# Patient Record
Sex: MALE | Race: WHITE | NOT HISPANIC OR LATINO | Employment: FULL TIME | URBAN - METROPOLITAN AREA
[De-identification: names, ages, dates, MRNs, and addresses within clinical notes are randomized per-mention and may not be internally consistent; named-entity substitution may affect disease eponyms.]

---

## 2019-01-23 ENCOUNTER — HOSPITAL ENCOUNTER (EMERGENCY)
Facility: CLINIC | Age: 44
Discharge: HOME | End: 2019-01-23
Attending: NURSE PRACTITIONER | Admitting: NURSE PRACTITIONER

## 2019-01-23 VITALS
BODY MASS INDEX: 22.22 KG/M2 | HEIGHT: 69 IN | RESPIRATION RATE: 16 BRPM | OXYGEN SATURATION: 100 % | SYSTOLIC BLOOD PRESSURE: 113 MMHG | TEMPERATURE: 98.1 F | WEIGHT: 150 LBS | DIASTOLIC BLOOD PRESSURE: 59 MMHG

## 2019-01-23 DIAGNOSIS — K60.2 ANAL FISSURE: Primary | ICD-10-CM

## 2019-01-23 PROCEDURE — 99284 EMERGENCY DEPT VISIT MOD MDM: CPT | Mod: Z6 | Performed by: NURSE PRACTITIONER

## 2019-01-23 PROCEDURE — 99282 EMERGENCY DEPT VISIT SF MDM: CPT | Performed by: NURSE PRACTITIONER

## 2019-01-23 RX ORDER — IBUPROFEN 200 MG
600 TABLET ORAL EVERY 6 HOURS PRN
COMMUNITY

## 2019-01-23 ASSESSMENT — ENCOUNTER SYMPTOMS
SHORTNESS OF BREATH: 0
DIAPHORESIS: 0
CONSTIPATION: 0
CHILLS: 0
HEMATURIA: 0
COUGH: 0
BLOOD IN STOOL: 0
WOUND: 0
SEIZURES: 0
APPETITE CHANGE: 1
EYE PAIN: 0
FEVER: 0
DIFFICULTY URINATING: 0
SPEECH DIFFICULTY: 0
ABDOMINAL DISTENTION: 0
NAUSEA: 1
CONFUSION: 0
DIZZINESS: 0
ABDOMINAL PAIN: 0
DIARRHEA: 1
DYSURIA: 0
RECTAL PAIN: 1
WHEEZING: 0
VOMITING: 0
ANAL BLEEDING: 0

## 2019-01-23 ASSESSMENT — MIFFLIN-ST. JEOR: SCORE: 1565.78

## 2019-01-23 NOTE — ED AVS SNAPSHOT
[facility] Emergency Department  [address] [address]  Phone:  [phone]  Fax:  [phone]                                    Doug Cowart   MRN: [id number]    Department:  [facility] Emergency Department   Date of Visit:  1/23/2019           After Visit Summary Signature Page    I have received my discharge instructions, and my questions have been answered. I have discussed any challenges I see with this plan with the nurse or doctor.    ..........................................................................................................................................  Patient/Patient Representative Signature      ..........................................................................................................................................  Patient Representative Print Name and Relationship to Patient    ..................................................               ................................................  Date                                   Time    ..........................................................................................................................................  Reviewed by Signature/Title    ...................................................              ..............................................  Date                                               Time          22EPIC Rev 08/18

## 2019-01-23 NOTE — ED PROVIDER NOTES
History     Chief Complaint   Patient presents with     Hemorrhoids     HPI  Doug Cowart is a 43 year old male who is to past medical history of tobacco abuse presenting to the emergency department with concerns of hemorrhoids.  Patient states that he has had ongoing rectal pain for the past 8 days.  Patient states that his symptoms were preceded with nausea vomiting and profuse diarrhea that is subsequently resolving.  Patient states that he has intense rectal pain that he rates as a 10 out of a 10.  Patient denies any rectal bleeding.  Patient states sitting, standing, rectal pressure aggravate his symptoms.  Patient states he has tried tub baths and this is somewhat relieving.  Patient is also been taking ibuprofen 3 tablets 4 times daily, Preparation H and Tucks without any improvement long-term.    Patient denies fever, aches, chills, sweats, chest pain, abdominal pain, dysuria, hematuria, mental status changes including speech difficulty, weakness left or right sided numbness.  Patient reports occasional nicotine use and admits to occasional marijuana use but denies recreational drug use.  Patient denies any suicidal thoughts or thoughts of harming himself presently.    Allergies:  No Known Allergies    Problem List:    Patient Active Problem List    Diagnosis Date Noted     Suicide attempt (H) 11/19/2015     Priority: Medium     Abdominal pain, right lower quadrant 05/02/2011     Priority: Medium     Smoking 06/02/2005     Priority: Medium        Past Medical History:    Past Medical History:   Diagnosis Date     Crohn disease (H)        Past Surgical History:    No past surgical history on file.    Family History:    Family History   Problem Relation Age of Onset     Respiratory Maternal Grandmother      Cancer Maternal Grandfather         throat     Hypertension Paternal Grandmother      Hypertension Paternal Grandfather        Social History:  Marital Status:  Single [1]  Social History     Tobacco  "Use     Smoking status: Current Every Day Smoker     Packs/day: 1.00     Years: 21.00     Pack years: 21.00     Types: Cigarettes     Tobacco comment: is  currently doing e-cig   Substance Use Topics     Alcohol use: Yes     Comment: occassionally     Drug use: Yes     Comment: marijuana        Medications:      COMPOUNDED NON-CONTROLLED SUBSTANCE (CMPD RX) - PHARMACY TO MIX COMPOUNDED MEDICATION   ibuprofen (ADVIL/MOTRIN) 200 MG tablet         Review of Systems   Constitutional: Positive for appetite change. Negative for chills, diaphoresis and fever.   HENT: Negative for ear pain.    Eyes: Negative for pain and visual disturbance.   Respiratory: Negative for cough, shortness of breath and wheezing.    Cardiovascular: Negative for chest pain.   Gastrointestinal: Positive for diarrhea, nausea (gaggy) and rectal pain. Negative for abdominal distention, abdominal pain, anal bleeding, blood in stool, constipation and vomiting.   Genitourinary: Negative for difficulty urinating, dysuria, hematuria, penile pain and urgency.   Skin: Negative for rash and wound.   Neurological: Negative for dizziness, seizures and speech difficulty.   Psychiatric/Behavioral: Negative for confusion and self-injury.   All other systems reviewed and are negative.      Physical Exam   BP: 113/59  Heart Rate: 69  Temp: 98.1  F (36.7  C)  Resp: 16  Height: 175.3 cm (5' 9\")  Weight: 68 kg (150 lb)  SpO2: 100 %      Physical Exam   Constitutional: He is oriented to person, place, and time. Vital signs are normal. He appears well-developed and well-nourished. He is cooperative. He appears distressed (appears to be in pain).   HENT:   Head: Normocephalic and atraumatic.   Right Ear: External ear normal.   Left Ear: External ear normal.   Nose: Nose normal.   Mouth/Throat: Oropharynx is clear and moist.   Eyes: Conjunctivae are normal. Right eye exhibits no discharge. Left eye exhibits no discharge.   Cardiovascular: Normal rate, regular rhythm, " normal heart sounds and intact distal pulses. Exam reveals no gallop and no friction rub.   No murmur heard.  Pulmonary/Chest: Effort normal and breath sounds normal. No stridor. No respiratory distress. He has no wheezes. He has no rales.   Genitourinary: Rectal exam shows external hemorrhoid (small hemorrhoids but none active, inflammed, none thrombosed), fissure and tenderness (exquisite tenderness with rectal exam. perianal tenderness extending anterior and posterior to anus without inflammation, erythema). Rectal exam shows no mass, anal tone normal and guaiac negative stool.   Neurological: He is alert and oriented to person, place, and time.   Skin: Skin is warm. Capillary refill takes less than 2 seconds. No rash noted. No erythema. No pallor.   Nursing note and vitals reviewed.      ED Course        Procedures    No results found for this or any previous visit (from the past 24 hour(s)).    Medications - No data to display    Assessments & Plan (with Medical Decision Making)     I have reviewed the nursing notes.    I have reviewed the findings, diagnosis, plan and need for follow up with the patient.  Doug Cowart is a 43 year old male who is to past medical history of tobacco abuse presenting to the emergency department with rectal pain for 8 days without rectal bleeding, fever, aches, chills, history of ulcerative colitis, thrombosed hemorrhoids, Crohn's disease, diverticulitis.  Exam as noted above and completed by self and also with Dr. Dove and positive for anal fissures without any evidence of infection or perirectal abscess or thrombosed hemorrhoid.  We will treat with nifedipine and lidocaine twice daily and discussed this with patient discussed warm tub soaks.  Surgical referral placed.  Discussed reasons to return.  Patient verbalizes understanding and was discharged in stable condition.       Medication List      Started    COMPOUNDED NON-CONTROLLED SUBSTANCE - PHARMACY TO MIX COMPOUNDED  MEDICATION  Commonly known as:  CMPD RX  Insert bid            Final diagnoses:   Anal fissure       1/23/2019   [facility] EMERGENCY DEPARTMENT     Marley Vyas APRN CNP  01/23/19 [id number]

## 2022-04-29 ENCOUNTER — OFFICE VISIT (OUTPATIENT)
Dept: FAMILY MEDICINE | Facility: CLINIC | Age: 47
End: 2022-04-29
Payer: COMMERCIAL

## 2022-04-29 VITALS
RESPIRATION RATE: 14 BRPM | DIASTOLIC BLOOD PRESSURE: 68 MMHG | OXYGEN SATURATION: 97 % | HEIGHT: 69 IN | WEIGHT: 155.2 LBS | HEART RATE: 106 BPM | SYSTOLIC BLOOD PRESSURE: 102 MMHG | BODY MASS INDEX: 22.99 KG/M2

## 2022-04-29 DIAGNOSIS — G47.00 INSOMNIA, UNSPECIFIED TYPE: ICD-10-CM

## 2022-04-29 DIAGNOSIS — F12.10 CANNABIS ABUSE: ICD-10-CM

## 2022-04-29 DIAGNOSIS — Z09 HOSPITAL DISCHARGE FOLLOW-UP: Primary | ICD-10-CM

## 2022-04-29 DIAGNOSIS — T14.91XA SUICIDE ATTEMPT (H): ICD-10-CM

## 2022-04-29 DIAGNOSIS — Z12.11 COLON CANCER SCREENING: ICD-10-CM

## 2022-04-29 PROCEDURE — 99203 OFFICE O/P NEW LOW 30 MIN: CPT | Performed by: FAMILY MEDICINE

## 2022-04-29 RX ORDER — TRAZODONE HYDROCHLORIDE 50 MG/1
TABLET, FILM COATED ORAL
Qty: 90 TABLET | Refills: 1 | Status: SHIPPED | OUTPATIENT
Start: 2022-04-29

## 2022-04-29 RX ORDER — TRAZODONE HYDROCHLORIDE 50 MG/1
TABLET, FILM COATED ORAL
COMMUNITY
Start: 2022-04-26 | End: 2022-04-29

## 2022-04-29 ASSESSMENT — ANXIETY QUESTIONNAIRES
6. BECOMING EASILY ANNOYED OR IRRITABLE: MORE THAN HALF THE DAYS
5. BEING SO RESTLESS THAT IT IS HARD TO SIT STILL: MORE THAN HALF THE DAYS
7. FEELING AFRAID AS IF SOMETHING AWFUL MIGHT HAPPEN: MORE THAN HALF THE DAYS
1. FEELING NERVOUS, ANXIOUS, OR ON EDGE: MORE THAN HALF THE DAYS
GAD7 TOTAL SCORE: 15
3. WORRYING TOO MUCH ABOUT DIFFERENT THINGS: NEARLY EVERY DAY
2. NOT BEING ABLE TO STOP OR CONTROL WORRYING: NEARLY EVERY DAY

## 2022-04-29 ASSESSMENT — ENCOUNTER SYMPTOMS: NERVOUS/ANXIOUS: 1

## 2022-04-29 ASSESSMENT — PATIENT HEALTH QUESTIONNAIRE - PHQ9
SUM OF ALL RESPONSES TO PHQ QUESTIONS 1-9: 12
5. POOR APPETITE OR OVEREATING: SEVERAL DAYS

## 2022-04-29 ASSESSMENT — PAIN SCALES - GENERAL: PAINLEVEL: MILD PAIN (2)

## 2022-04-29 NOTE — PROGRESS NOTES
Assessment & Plan     Hospital discharge follow-up  47 yr old male here for hospital follow up  Had suicidal ideations and voluntarily went to the ED   Has underlying anxiety disorder and depression and has a history of THC and alcohol dependence  Declined services to help with this and says he can do it on his own.  Also declined medication to help with anxiety and depression  Was open to taking medication for insomnia  He has an appt in June to see a counselor.       Insomnia, unspecified type  Medication faxed with refills  - traZODone (DESYREL) 50 MG tablet; TAKE 1 TABLET BY MOUTH AT BEDTIME - MAY REPEAT ONCE    Colon cancer screening  Reminded of colon cancer screening.  - Adult Gastro Ref - Procedure Only; Future    Suicide attempt (H)  Patient says he is feeling better. Says he has no more suicidal thoughts.    Cannabis abuse  Offered services to help with this and patient says he has it under control.       [id number]     Depression Screening Follow Up    PHQ 4/29/2022   PHQ-9 Total Score 12   Q9: Thoughts of better off dead/self-harm past 2 weeks Several days     Last PHQ-9 4/29/2022   1.  Little interest or pleasure in doing things 1   2.  Feeling down, depressed, or hopeless 1   3.  Trouble falling or staying asleep, or sleeping too much 2   4.  Feeling tired or having little energy 2   5.  Poor appetite or overeating 1   6.  Feeling bad about yourself 2   7.  Trouble concentrating 1   8.  Moving slowly or restless 1   Q9: Thoughts of better off dead/self-harm past 2 weeks 1   PHQ-9 Total Score 12         No flowsheet data found.      Follow Up      Follow Up Actions Taken  Crisis resource information provided in the After Visit Summary    Discussed the following ways the patient can remain in a safe environment:  remove alcohol, remove drugs and be around others  FUTURE APPOINTMENTS:       - Follow-up visit in one month or sooner as needed.    Return in about 4 weeks (around 5/27/2022) for Follow  "up.    Jonathan Powers MD  [facility] KEYON Camacho is a 47 year old who presents for the following health issues         Anxiety           Hospital Follow-up Visit:    Hospital/Nursing Home/IP Rehab Facility: [facility]   Date of Admission: 04/21/2022  Date of Discharge: 04/26/2022  Reason(s) for Admission: anxiety and depression      Was your hospitalization related to COVID-19? No   Problems taking medications regularly:  None  Medication changes since discharge: None  Problems adhering to non-medication therapy:  None    Summary of hospitalization:  [facility] discharge summary reviewed  Diagnostic Tests/Treatments reviewed.  Follow up needed: none  Other Healthcare Providers Involved in Patient s Care:         None  Update since discharge: improved.   Post Discharge Medication Reconciliation: discharge medications reconciled, continue medications without change.  Plan of care communicated with patient              Review of Systems   Constitutional: Negative.    HENT: Negative.    Eyes: Negative.    Respiratory: Negative.    Cardiovascular: Negative.    Gastrointestinal: Negative.    Endocrine: Negative.    Genitourinary: Negative.    Musculoskeletal: Negative.    Skin: Negative.    Allergic/Immunologic: Negative.    Neurological: Negative.    Hematological: Negative.    Psychiatric/Behavioral: Positive for behavioral problems, mood changes and sleep disturbance. The patient is nervous/anxious.             Objective    /68 (BP Location: Left arm, Patient Position: Sitting, Cuff Size: Adult Regular)   Pulse 106   Resp 14   Ht 1.75 m (5' 8.9\")   Wt 70.4 kg (155 lb 3.2 oz)   SpO2 97%   BMI 22.99 kg/m    Body mass index is 22.99 kg/m .  Physical Exam   GENERAL: healthy, alert and no distress  EYES: Eyes grossly normal to inspection, PERRL and conjunctivae and sclerae normal  HENT: ear canals and TM's normal, nose and mouth " without ulcers or lesions  NECK: no adenopathy, no asymmetry, masses, or scars and thyroid normal to palpation  RESP: lungs clear to auscultation - no rales, rhonchi or wheezes  CV: regular rate and rhythm, normal S1 S2, no S3 or S4, no murmur, click or rub, no peripheral edema and peripheral pulses strong  ABDOMEN: soft, nontender, no hepatosplenomegaly, no masses and bowel sounds normal  MS: no gross musculoskeletal defects noted, no edema  SKIN: no suspicious lesions or rashes  BACK: no CVA tenderness, no paralumbar tenderness  PSYCH: mentation appears normal, affect normal/bright

## 2022-04-30 ASSESSMENT — ANXIETY QUESTIONNAIRES: GAD7 TOTAL SCORE: 15

## 2022-05-01 ASSESSMENT — ENCOUNTER SYMPTOMS
SLEEP DISTURBANCE: 1
CONSTITUTIONAL NEGATIVE: 1
GASTROINTESTINAL NEGATIVE: 1
CARDIOVASCULAR NEGATIVE: 1
MUSCULOSKELETAL NEGATIVE: 1
ENDOCRINE NEGATIVE: 1
HEMATOLOGIC/LYMPHATIC NEGATIVE: 1
ALLERGIC/IMMUNOLOGIC NEGATIVE: 1
EYES NEGATIVE: 1
NEUROLOGICAL NEGATIVE: 1
RESPIRATORY NEGATIVE: 1

## 2022-11-09 ENCOUNTER — TELEPHONE (OUTPATIENT)
Dept: FAMILY MEDICINE | Facility: CLINIC | Age: 47
End: 2022-11-09

## 2022-11-09 NOTE — LETTER
November 9, 2022      Doug [address]   [facility] [address]        Dear Doug,     November 9, 2022    To  Doug Cowart  [address]   [facility] [address]    Your team at [facility] cares about your health. We have reviewed your chart and based on our findings; we are making the following recommendations to better manage your health.     You are in particular need of attention regarding the following:     PREVENTATIVE VISIT: Physical  OTHER FOLLOW UP: Reminder letter about scheduling the colonoscopy from your previous order at your convenience.  You can call [phone] to schedule this.    If you have already completed these items, please contact the clinic via phone or   [facility] so your care team can review and update your records. Thank you for   choosing [facility] for your healthcare needs. For any questions,   concerns, or to schedule an appointment please contact our clinic.    Healthy Regards,    Your [facility] Care Team    Sincerely,  Jonathan Powers MD/freeman

## 2022-11-09 NOTE — TELEPHONE ENCOUNTER
Patient Quality Outreach    Patient is due for the following:   Colon Cancer Screening  Physical Preventive Adult Physical    Next Steps:   Schedule a Adult Preventative   Will remind to call for colonoscopy that was ordered.    Type of outreach:    Sent letter.      Questions for provider review:    None     Fadumo Martinez, [facility]  Chart routed to none, letter sent.

## 2023-05-24 ENCOUNTER — TELEPHONE (OUTPATIENT)
Dept: FAMILY MEDICINE | Facility: CLINIC | Age: 48
End: 2023-05-24
Payer: COMMERCIAL

## 2023-05-24 NOTE — TELEPHONE ENCOUNTER
Patient Quality Outreach    Patient is due for the following:   Colon Cancer Screening  Physical Preventive Adult Physical    Next Steps:   Schedule a Adult Preventative    Type of outreach:    Sent letter.    Next Steps:  Reach out within 90 days via Letter.    Max number of attempts reached: No. Will try again in 90 days if patient still on fail list.    Questions for provider review:    None           Fadumo Martinez, [facility]  Chart routed to none, letter sent.

## 2023-05-24 NOTE — LETTER
May 24, 2023    To  Doug Cowart  [address]   [facility] [address]    Your team at [facility] cares about your health. We have reviewed your chart and based on our findings; we are making the following recommendations to better manage your health.     You are in particular need of attention regarding the following:     Call or MyChart message your clinic to schedule a colonoscopy, schedule/ a FIT Test, or order a Cologuard test. If you are unsure what type of test you need, please call your clinic and speak to clinic staff.   Colon cancer is now the second leading cause of cancer-related deaths in the [place] for both men and women and there are over 130,000 new cases and 50,000 deaths per year from colon cancer. Colonoscopies can prevent 90-95% of these deaths. Problem lesions can be removed before they ever become cancer. This test is not only looking for cancer, but also getting rid of precancerous lesions.   If you are under/uninsured, we recommend you contact the [employer] Program.[employer] is a free colorectal cancer screening program that provides colonoscopies for eligible under/uninsured [place] men and women. If you are interested in receiving a free colonoscopy, please call [employer] at t [phone] (mention code [employer]) to see if you're eligible. Please have them send us the results.   PREVENTATIVE VISIT: Physical    If you have already completed these items, please contact the clinic via phone or   [facility] so your care team can review and update your records. Thank you for   choosing [facility] for your healthcare needs. For any questions,   concerns, or to schedule an appointment please contact our clinic.    Healthy Regards,    Your [facility] Care Team